# Patient Record
Sex: MALE | Race: BLACK OR AFRICAN AMERICAN | NOT HISPANIC OR LATINO | URBAN - NONMETROPOLITAN AREA
[De-identification: names, ages, dates, MRNs, and addresses within clinical notes are randomized per-mention and may not be internally consistent; named-entity substitution may affect disease eponyms.]

---

## 2021-10-19 ENCOUNTER — IMPORTED ENCOUNTER (OUTPATIENT)
Dept: URBAN - NONMETROPOLITAN AREA CLINIC 1 | Facility: CLINIC | Age: 46
End: 2021-10-19

## 2021-10-19 PROBLEM — H52.4: Noted: 2021-10-19

## 2021-10-19 PROCEDURE — S0620 ROUTINE OPHTHALMOLOGICAL EXA: HCPCS

## 2021-10-19 NOTE — PATIENT DISCUSSION
Myopia / Astigmatism / Presbyopia OU - Discussed diagnosis in detail with patient- New Glasses RX given today- Continue to monitor- RTC 1 year complete

## 2022-04-09 ASSESSMENT — TONOMETRY
OS_IOP_MMHG: 20
OD_IOP_MMHG: 18

## 2022-04-09 ASSESSMENT — VISUAL ACUITY
OS_SC: 20/50-2
OD_SC: 20/29-2